# Patient Record
Sex: FEMALE | Race: WHITE | NOT HISPANIC OR LATINO | Employment: OTHER | URBAN - METROPOLITAN AREA
[De-identification: names, ages, dates, MRNs, and addresses within clinical notes are randomized per-mention and may not be internally consistent; named-entity substitution may affect disease eponyms.]

---

## 2017-04-18 PROBLEM — H65.92 LEFT OTITIS MEDIA WITH EFFUSION: Status: ACTIVE | Noted: 2017-04-18

## 2018-09-19 PROBLEM — M17.10 ARTHRITIS OF KNEE: Status: ACTIVE | Noted: 2018-09-19

## 2019-08-13 ENCOUNTER — LAB VISIT (OUTPATIENT)
Dept: LAB | Facility: HOSPITAL | Age: 69
End: 2019-08-13
Attending: INTERNAL MEDICINE
Payer: MEDICARE

## 2019-08-13 DIAGNOSIS — C50.412 MALIGNANT NEOPLASM OF UPPER-OUTER QUADRANT OF LEFT BREAST IN FEMALE, ESTROGEN RECEPTOR NEGATIVE: ICD-10-CM

## 2019-08-13 DIAGNOSIS — Z17.1 MALIGNANT NEOPLASM OF UPPER-OUTER QUADRANT OF LEFT BREAST IN FEMALE, ESTROGEN RECEPTOR NEGATIVE: ICD-10-CM

## 2019-08-13 LAB
ALBUMIN SERPL BCP-MCNC: 4.8 G/DL (ref 3.5–5.2)
ALP SERPL-CCNC: 92 U/L (ref 38–145)
ALT SERPL W/O P-5'-P-CCNC: 23 U/L (ref 10–44)
ANION GAP SERPL CALC-SCNC: 11 MMOL/L (ref 8–16)
AST SERPL-CCNC: 25 U/L (ref 14–36)
BASOPHILS # BLD AUTO: 0.04 K/UL (ref 0–0.2)
BASOPHILS NFR BLD: 0.8 % (ref 0–1.9)
BILIRUB SERPL-MCNC: 0.6 MG/DL (ref 0.2–1.3)
BUN SERPL-MCNC: 19 MG/DL (ref 7–18)
CALCIUM SERPL-MCNC: 9 MG/DL (ref 8.4–10.2)
CHLORIDE SERPL-SCNC: 99 MMOL/L (ref 95–110)
CO2 SERPL-SCNC: 27 MMOL/L (ref 22–31)
CREAT SERPL-MCNC: 0.61 MG/DL (ref 0.5–1.4)
DIFFERENTIAL METHOD: NORMAL
EOSINOPHIL # BLD AUTO: 0.1 K/UL (ref 0–0.5)
EOSINOPHIL NFR BLD: 2.3 % (ref 0–8)
ERYTHROCYTE [DISTWIDTH] IN BLOOD BY AUTOMATED COUNT: 12.1 % (ref 11.5–14.5)
EST. GFR  (AFRICAN AMERICAN): >60 ML/MIN/1.73 M^2
EST. GFR  (NON AFRICAN AMERICAN): >60 ML/MIN/1.73 M^2
GLUCOSE SERPL-MCNC: 195 MG/DL (ref 70–110)
HCT VFR BLD AUTO: 38.8 % (ref 37–48.5)
HGB BLD-MCNC: 13 G/DL (ref 12–16)
IMM GRANULOCYTES # BLD AUTO: 0.02 K/UL (ref 0–0.04)
IMM GRANULOCYTES NFR BLD AUTO: 0.4 % (ref 0–0.5)
LYMPHOCYTES # BLD AUTO: 1.9 K/UL (ref 1–4.8)
LYMPHOCYTES NFR BLD: 36.5 % (ref 18–48)
MCH RBC QN AUTO: 29.8 PG (ref 27–31)
MCHC RBC AUTO-ENTMCNC: 33.5 G/DL (ref 32–36)
MCV RBC AUTO: 89 FL (ref 82–98)
MONOCYTES # BLD AUTO: 0.5 K/UL (ref 0.3–1)
MONOCYTES NFR BLD: 8.8 % (ref 4–15)
NEUTROPHILS # BLD AUTO: 2.7 K/UL (ref 1.8–7.7)
NEUTROPHILS NFR BLD: 51.2 % (ref 38–73)
NRBC BLD-RTO: 0 /100 WBC
PLATELET # BLD AUTO: 210 K/UL (ref 150–350)
PMV BLD AUTO: 9.9 FL (ref 9.2–12.9)
POTASSIUM SERPL-SCNC: 4.9 MMOL/L (ref 3.5–5.1)
PROT SERPL-MCNC: 8.1 G/DL (ref 6–8.4)
RBC # BLD AUTO: 4.36 M/UL (ref 4–5.4)
SODIUM SERPL-SCNC: 137 MMOL/L (ref 136–145)
WBC # BLD AUTO: 5.2 K/UL (ref 3.9–12.7)

## 2019-08-13 PROCEDURE — 85025 COMPLETE CBC W/AUTO DIFF WBC: CPT | Mod: PN

## 2019-08-13 PROCEDURE — 85025 COMPLETE CBC W/AUTO DIFF WBC: CPT

## 2019-08-13 PROCEDURE — 36415 COLL VENOUS BLD VENIPUNCTURE: CPT | Mod: PN

## 2019-08-13 PROCEDURE — 80053 COMPREHEN METABOLIC PANEL: CPT | Mod: PN

## 2019-08-13 PROCEDURE — 80053 COMPREHEN METABOLIC PANEL: CPT

## 2019-09-19 PROBLEM — C50.919 TRIPLE NEGATIVE MALIGNANT NEOPLASM OF BREAST: Status: ACTIVE | Noted: 2019-09-19

## 2019-09-19 PROBLEM — Z17.421 TRIPLE NEGATIVE MALIGNANT NEOPLASM OF BREAST: Status: ACTIVE | Noted: 2019-09-19

## 2019-09-19 PROBLEM — C50.912 INFILTRATING DUCTAL CARCINOMA OF LEFT BREAST: Status: ACTIVE | Noted: 2019-09-19

## 2019-10-07 PROBLEM — C50.412 MALIGNANT NEOPLASM OF UPPER-OUTER QUADRANT OF LEFT BREAST IN FEMALE, ESTROGEN RECEPTOR NEGATIVE: Status: ACTIVE | Noted: 2019-10-07

## 2019-10-07 PROBLEM — Z17.1 MALIGNANT NEOPLASM OF UPPER-OUTER QUADRANT OF LEFT BREAST IN FEMALE, ESTROGEN RECEPTOR NEGATIVE: Status: ACTIVE | Noted: 2019-10-07

## 2019-10-24 ENCOUNTER — TELEPHONE (OUTPATIENT)
Dept: INFUSION THERAPY | Facility: HOSPITAL | Age: 69
End: 2019-10-24

## 2019-10-24 NOTE — TELEPHONE ENCOUNTER
Spoke with glenda patient's daughter patient reschedule chemo from 10/28/19 to 11/11/19 to start due to port wasn't being placed into 11/05/19 and daughter will be out of town into 11/06/19- 11/09/19 they want to start on 11/11/19. MD office was notified of above

## 2019-10-24 NOTE — TELEPHONE ENCOUNTER
----- Message from Lalita Edwards sent at 10/24/2019 12:16 PM CDT -----  Contact: Henny (daughter)[phone]  Pt daughter called and had questions about pt's schedule.

## 2019-10-25 ENCOUNTER — DOCUMENTATION ONLY (OUTPATIENT)
Dept: INFUSION THERAPY | Facility: HOSPITAL | Age: 69
End: 2019-10-25

## 2019-10-25 NOTE — PROGRESS NOTES
Nutrition: Met with patient today while she was here for chemo new patient education. Patient is currently a mild nutritional risk due to weight loss. Patient eating less than usual. Wt: 199# Discussed the importance of weight maintenance and nutrition during treatment. Discussed food safety, nausea mgt, and bowel mgt. Offered support and encouragement. Aurora Prieto MS, RD, LDN  
H/O arthroscopy of right knee

## 2019-11-06 ENCOUNTER — TELEPHONE (OUTPATIENT)
Dept: INFUSION THERAPY | Facility: HOSPITAL | Age: 69
End: 2019-11-06

## 2019-11-06 NOTE — TELEPHONE ENCOUNTER
----- Message from Lalita Edwards sent at 11/6/2019 10:30 AM CST -----  Contact: Isela [phone]  Pt need to reschedule appt

## 2019-11-06 NOTE — TELEPHONE ENCOUNTER
----- Message from Gina Krishnamurthy MA sent at 11/6/2019  1:30 PM CST -----  Per Doctor Joao pt need to be seen by him before radiation. Pt added to scheduled to see Doctor Shepherd on 11/12/19 at 4pm. Verbalized understanding with pt.  ----- Message -----  From: Millie Milan MA  Sent: 11/6/2019  11:57 AM CST  To: Joao Kwok Staff    Patient called and cancel her chemo she is not going to take chemo she is going to do xrt just letting you know

## 2021-01-15 PROBLEM — L57.0 ACTINIC KERATOSIS: Status: ACTIVE | Noted: 2021-01-15

## 2021-06-04 PROBLEM — M79.602 LEFT ARM PAIN: Status: ACTIVE | Noted: 2021-06-04

## 2021-08-10 PROBLEM — M72.0 DUPUYTREN'S DISEASE OF PALM: Status: ACTIVE | Noted: 2021-08-10

## 2021-08-10 PROBLEM — M65.332 TRIGGER FINGER, LEFT MIDDLE FINGER: Status: ACTIVE | Noted: 2021-08-10

## 2022-08-03 NOTE — TELEPHONE ENCOUNTER
Spoke with patient she cancel her chemo stating she is not going to take chemo she is going to do xrt. Appointments were cancel message sent to md office regarding above   3 = A little assistance

## 2022-08-17 PROBLEM — F51.01 PRIMARY INSOMNIA: Status: ACTIVE | Noted: 2022-08-17

## 2022-08-17 PROBLEM — H65.92 LEFT OTITIS MEDIA WITH EFFUSION: Status: RESOLVED | Noted: 2017-04-18 | Resolved: 2022-08-17

## 2022-08-17 PROBLEM — I10 ESSENTIAL HYPERTENSION: Status: ACTIVE | Noted: 2022-08-17

## 2022-08-17 PROBLEM — F41.8 DEPRESSION WITH ANXIETY: Status: ACTIVE | Noted: 2022-08-17

## 2023-05-14 PROBLEM — I16.9 HYPERTENSIVE CRISIS: Status: ACTIVE | Noted: 2023-05-14
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
## 2023-05-14 PROBLEM — Z71.89 ACP (ADVANCE CARE PLANNING): Status: ACTIVE | Noted: 2023-05-14

## 2023-08-07 PROBLEM — I70.0 AORTIC ATHEROSCLEROSIS: Status: ACTIVE | Noted: 2023-08-07

## 2023-08-21 ENCOUNTER — OUTPATIENT CASE MANAGEMENT (OUTPATIENT)
Dept: ADMINISTRATIVE | Facility: OTHER | Age: 73
End: 2023-08-21
Payer: MEDICARE

## 2023-08-21 NOTE — PROGRESS NOTES
Outpatient Care Management   - Low Risk Patient Assessment    Patient: Isela Jacobo  MRN:  [id number]  Date of Service:  8/21/2023  Completed by:  Ronna Hwang LMSW  Referral Date: 08/07/2023    Reason for Visit   Patient presents with    Social Work Assessment - Low/Mod Risk    Plan Of Care    Case Closure       Brief Summary:  received a referral from patient PCP. SW completed assessment with patient . Patient reports being independent with IADL's and ADL's. Patient ambulates with a walker. Patient denied needing assistance with food, shelter, medication or medical. Patient reports transportation is a barrier. Patient reports daughter-in-law and or grandchildren provides transportation but grandchildren are going back to college soon.  SW explored transportation resources with patient.  Patient aware of all transportation resources in her area, however none are feasible as no transportation [place]. Patient will continue relying on granddaughter for transportation needs.  Patient requesting orders for PT  . SW will send a n ion basket message to PCP to place orders if an agreement with request. SW provided all available resources. SW will close case as all transportation resources have been exhausted.

## 2023-09-14 ENCOUNTER — TELEPHONE (OUTPATIENT)
Dept: PHARMACY | Facility: CLINIC | Age: 73
End: 2023-09-14
Payer: MEDICARE

## 2023-09-14 NOTE — TELEPHONE ENCOUNTER
Spoke with patient about referral for Mourn[facility]o or Ozempic.  Explained to the patient that the [facility] does not have a program.  I saw the doctor put the patient on Trulicity, the patient stated that the Ozempic caused her to get constipated and she asked the doctor to take her off of it. Explained to the patient that although [facility] has a program for Trulicity they are not accepting new patients.  Patient understands.

## 2024-09-06 ENCOUNTER — TELEPHONE (OUTPATIENT)
Dept: PHARMACY | Facility: CLINIC | Age: 74
End: 2024-09-06
Payer: MEDICARE

## 2024-09-06 NOTE — TELEPHONE ENCOUNTER
Isela Jacobo has been informed of the [facility] application process for Jardiance and what's required to apply.  She will provide the following documents: Proof of household Income( such as social security statement, 1099 form, pension statement or 3 consecutive pay stubs       Follow-up will be made in 5 business days.     Alessandra Zuluaga  Pharmacy Patient Assistance Team

## 2024-09-19 ENCOUNTER — PATIENT MESSAGE (OUTPATIENT)
Dept: PHARMACY | Facility: CLINIC | Age: 74
End: 2024-09-19
Payer: MEDICARE

## 2024-09-19 ENCOUNTER — TELEPHONE (OUTPATIENT)
Dept: PHARMACY | Facility: CLINIC | Age: 74
End: 2024-09-19
Payer: MEDICARE

## 2024-09-19 NOTE — TELEPHONE ENCOUNTER
We have reviewed Ms. Jacobo current medication list and/or insurance status. Unfortunately, The Pharmacy Patient Assistance Team is unable to assist at this time due to the following reasons      The [facility] Patient Assistance Program has temporarily stopped accepting new applications for Jailyn Zuluaga  Pharmacy Patient Assistance Team

## 2024-09-26 ENCOUNTER — TELEPHONE (OUTPATIENT)
Dept: PHARMACY | Facility: CLINIC | Age: 74
End: 2024-09-26
Payer: MEDICARE

## 2024-09-26 NOTE — TELEPHONE ENCOUNTER
Spoke with patient about her medications Trulicity and Jardiance. Patient does not qualify for programs because she has federal insurance though her drug company.  I was also able to send a message to the provider to send prescriptions to [facility] so that they can fill her medications and ship them to her.  I provided the patient with the phone number to contact them to set up delivery.  Patient understands no assistance needed at this time.